# Patient Record
(demographics unavailable — no encounter records)

---

## 2024-10-24 NOTE — HISTORY OF PRESENT ILLNESS
[FreeTextEntry1] : Wound  sites not healing properly. Are they infected? [de-identified] : Recent treatment for contact dermatitis. Went to [facility] for refill topical cream, received betamethasone ointment. Resolved. 2 biopsies done on right forearm and right hand. Reported as "negative" to patient. Has pending pacemaker placement. Concerned about infection. Retired . . h/o bcc forehead.

## 2025-01-14 NOTE — ASSESSMENT
[FreeTextEntry1] : Alert, oriented, well, pleasant.  keratotic crusted dark brown papule 9mm Inflamed seborrheic keratosis. Defers treatment.
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
## 2025-06-24 NOTE — ASSESSMENT
[FreeTextEntry1] : Alert, oriented, well, pleasant.  States treated for poison ivy while admitted at hospital to rule out cellulitis. Saw ID, Dr Payne, received shot of cortisone. On prednisone taper with 20 daily. Concerned will come back. Admits to poison ivy in bush he was trimming prior to onset of rash. erythematous urticarial papules left forearm. "New"  Contact dermatitis poison ivy. Takes Benadryl when itchy at night. Side effects discussed with patient. start prednisone 20 daily for 2 weeks. f/u if flares.
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
